# Patient Record
Sex: MALE | Race: WHITE | NOT HISPANIC OR LATINO | URBAN - METROPOLITAN AREA
[De-identification: names, ages, dates, MRNs, and addresses within clinical notes are randomized per-mention and may not be internally consistent; named-entity substitution may affect disease eponyms.]

---

## 2020-03-04 ENCOUNTER — OFFICE (OUTPATIENT)
Dept: URBAN - METROPOLITAN AREA CLINIC 10 | Facility: CLINIC | Age: 35
End: 2020-03-04

## 2020-03-04 VITALS
DIASTOLIC BLOOD PRESSURE: 65 MMHG | WEIGHT: 315 LBS | SYSTOLIC BLOOD PRESSURE: 124 MMHG | HEART RATE: 47 BPM | HEIGHT: 71 IN

## 2020-03-04 DIAGNOSIS — K21.9 GASTRO-ESOPHAGEAL REFLUX DISEASE WITHOUT ESOPHAGITIS: ICD-10-CM

## 2020-03-04 DIAGNOSIS — R10.11 RIGHT UPPER QUADRANT PAIN: ICD-10-CM

## 2020-03-04 PROCEDURE — 99204 OFFICE O/P NEW MOD 45 MIN: CPT | Performed by: INTERNAL MEDICINE

## 2020-03-04 NOTE — SERVICEHPINOTES
Patient is a 34-year-old obese  man with past medical history of thyroid disease, sleep apnea, hypertension, and recent history of DVT on Xarelto ( occurred after an ankle surgery in September 2019),  who presents for follow-up of right-sided abdominal pain that began acutely a couple weeks ago, and recently intensified, leading patient to the emergency room.  Patient had a right upper quadrant ultrasound that did not reveal any evidence of cholelithiasis, or gallbladder wall thickening.  The common bile duct was not dilated.  Liver tests were all normal.  CT angiogram of the chest did not reveal any pulmonary embolus, and CT of the abdomen also did not reveal any concerning findings the appendix was also noted to be normal. Patient was discharged with PPI, and with dicyclomine.  Patient reports that he does not have any significant baseline reflux, but does report taking Tums about 2-3 times per week.Of note, patient also has history of DVT several years prior, at which time he was doing with a back issue (slipped disc/pinched nerve).  Patient recently has not been on any heavy or chronic doses of NSAIDs. Patient has had multiple family members that have needed to have a cholecystectomy, suspects whether it it might be his turn.  He is also concerned about whether this pain could be related to his appendix, patient was reassured that the recent CT shows that his appendix was normal. Patient has some doubts about the accuracy of the imaging, as several years prior, he reports that a DVT in his leg was missed on ultrasound imaging.

## 2020-05-12 ENCOUNTER — OFFICE (OUTPATIENT)
Dept: URBAN - METROPOLITAN AREA CLINIC 10 | Facility: CLINIC | Age: 35
End: 2020-05-12

## 2020-05-12 VITALS
DIASTOLIC BLOOD PRESSURE: 79 MMHG | SYSTOLIC BLOOD PRESSURE: 139 MMHG | HEIGHT: 71 IN | HEART RATE: 139 BPM | WEIGHT: 315 LBS | RESPIRATION RATE: 67 BRPM

## 2020-05-12 DIAGNOSIS — R10.11 RIGHT UPPER QUADRANT PAIN: ICD-10-CM

## 2020-05-12 PROCEDURE — 99213 OFFICE O/P EST LOW 20 MIN: CPT | Performed by: INTERNAL MEDICINE

## 2020-05-12 NOTE — SERVICEHPINOTES
Patient is a 34-year-old  man with past medical history of thyroid disease, sleep apnea, hypertension, and recent history of DVT on Xarelto ( occurred after an ankle surgery in September 2019) presents for follow-up of right upper quadrant abdominal pain that is intermittent, and perhaps 1 to 2 times a week, and sometimes not at all.  Sometimes this pain is positional, if he is bending over.  Patient was thought to be having biliary colic. A HIDA scan was obtained which was unremarkable.  Patient was also evaluated by a surgeon, and cholecystectomy was offered, but patient would like to think about this.  Patient reports that he also underwent an upper GI series, and this was unremarkable. Patient reports that sometimes he "flops around" the middle of the night, and he notices this because his CPAP wires are tangled around him.  Patient reports that this is unusual for him, and he normally has a more sound sleeper.